# Patient Record
Sex: FEMALE | Race: ASIAN | NOT HISPANIC OR LATINO | URBAN - METROPOLITAN AREA
[De-identification: names, ages, dates, MRNs, and addresses within clinical notes are randomized per-mention and may not be internally consistent; named-entity substitution may affect disease eponyms.]

---

## 2023-10-29 ENCOUNTER — EMERGENCY (EMERGENCY)
Facility: HOSPITAL | Age: 73
LOS: 1 days | Discharge: HOME | End: 2023-10-29
Attending: EMERGENCY MEDICINE | Admitting: EMERGENCY MEDICINE
Payer: SELF-PAY

## 2023-10-29 VITALS
OXYGEN SATURATION: 96 % | RESPIRATION RATE: 16 BRPM | TEMPERATURE: 98 F | DIASTOLIC BLOOD PRESSURE: 72 MMHG | SYSTOLIC BLOOD PRESSURE: 126 MMHG | HEART RATE: 67 BPM

## 2023-10-29 VITALS
OXYGEN SATURATION: 97 % | WEIGHT: 165.35 LBS | HEART RATE: 73 BPM | DIASTOLIC BLOOD PRESSURE: 70 MMHG | SYSTOLIC BLOOD PRESSURE: 111 MMHG | RESPIRATION RATE: 16 BRPM | TEMPERATURE: 98 F | HEIGHT: 63.78 IN

## 2023-10-29 LAB
ALBUMIN SERPL ELPH-MCNC: 3.9 G/DL — SIGNIFICANT CHANGE UP (ref 3.4–5)
ALBUMIN SERPL ELPH-MCNC: 3.9 G/DL — SIGNIFICANT CHANGE UP (ref 3.4–5)
ALP SERPL-CCNC: 75 U/L — SIGNIFICANT CHANGE UP (ref 40–120)
ALP SERPL-CCNC: 75 U/L — SIGNIFICANT CHANGE UP (ref 40–120)
ALT FLD-CCNC: 31 U/L — SIGNIFICANT CHANGE UP (ref 12–42)
ALT FLD-CCNC: 31 U/L — SIGNIFICANT CHANGE UP (ref 12–42)
ANION GAP SERPL CALC-SCNC: 14 MMOL/L — SIGNIFICANT CHANGE UP (ref 9–16)
ANION GAP SERPL CALC-SCNC: 14 MMOL/L — SIGNIFICANT CHANGE UP (ref 9–16)
AST SERPL-CCNC: 43 U/L — HIGH (ref 15–37)
AST SERPL-CCNC: 43 U/L — HIGH (ref 15–37)
BASOPHILS # BLD AUTO: 0.07 K/UL — SIGNIFICANT CHANGE UP (ref 0–0.2)
BASOPHILS # BLD AUTO: 0.07 K/UL — SIGNIFICANT CHANGE UP (ref 0–0.2)
BASOPHILS NFR BLD AUTO: 0.6 % — SIGNIFICANT CHANGE UP (ref 0–2)
BASOPHILS NFR BLD AUTO: 0.6 % — SIGNIFICANT CHANGE UP (ref 0–2)
BILIRUB SERPL-MCNC: 1.1 MG/DL — SIGNIFICANT CHANGE UP (ref 0.2–1.2)
BILIRUB SERPL-MCNC: 1.1 MG/DL — SIGNIFICANT CHANGE UP (ref 0.2–1.2)
BUN SERPL-MCNC: 25 MG/DL — HIGH (ref 7–23)
BUN SERPL-MCNC: 25 MG/DL — HIGH (ref 7–23)
CALCIUM SERPL-MCNC: 9.8 MG/DL — SIGNIFICANT CHANGE UP (ref 8.5–10.5)
CALCIUM SERPL-MCNC: 9.8 MG/DL — SIGNIFICANT CHANGE UP (ref 8.5–10.5)
CHLORIDE SERPL-SCNC: 102 MMOL/L — SIGNIFICANT CHANGE UP (ref 96–108)
CHLORIDE SERPL-SCNC: 102 MMOL/L — SIGNIFICANT CHANGE UP (ref 96–108)
CO2 SERPL-SCNC: 20 MMOL/L — LOW (ref 22–31)
CO2 SERPL-SCNC: 20 MMOL/L — LOW (ref 22–31)
CREAT SERPL-MCNC: 0.59 MG/DL — SIGNIFICANT CHANGE UP (ref 0.5–1.3)
CREAT SERPL-MCNC: 0.59 MG/DL — SIGNIFICANT CHANGE UP (ref 0.5–1.3)
EGFR: 95 ML/MIN/1.73M2 — SIGNIFICANT CHANGE UP
EGFR: 95 ML/MIN/1.73M2 — SIGNIFICANT CHANGE UP
EOSINOPHIL # BLD AUTO: 0.08 K/UL — SIGNIFICANT CHANGE UP (ref 0–0.5)
EOSINOPHIL # BLD AUTO: 0.08 K/UL — SIGNIFICANT CHANGE UP (ref 0–0.5)
EOSINOPHIL NFR BLD AUTO: 0.7 % — SIGNIFICANT CHANGE UP (ref 0–6)
EOSINOPHIL NFR BLD AUTO: 0.7 % — SIGNIFICANT CHANGE UP (ref 0–6)
GLUCOSE SERPL-MCNC: 173 MG/DL — HIGH (ref 70–99)
GLUCOSE SERPL-MCNC: 173 MG/DL — HIGH (ref 70–99)
HCT VFR BLD CALC: 44.1 % — SIGNIFICANT CHANGE UP (ref 34.5–45)
HCT VFR BLD CALC: 44.1 % — SIGNIFICANT CHANGE UP (ref 34.5–45)
HGB BLD-MCNC: 14.7 G/DL — SIGNIFICANT CHANGE UP (ref 11.5–15.5)
HGB BLD-MCNC: 14.7 G/DL — SIGNIFICANT CHANGE UP (ref 11.5–15.5)
IMM GRANULOCYTES NFR BLD AUTO: 0.3 % — SIGNIFICANT CHANGE UP (ref 0–0.9)
IMM GRANULOCYTES NFR BLD AUTO: 0.3 % — SIGNIFICANT CHANGE UP (ref 0–0.9)
LYMPHOCYTES # BLD AUTO: 18.6 % — SIGNIFICANT CHANGE UP (ref 13–44)
LYMPHOCYTES # BLD AUTO: 18.6 % — SIGNIFICANT CHANGE UP (ref 13–44)
LYMPHOCYTES # BLD AUTO: 2.17 K/UL — SIGNIFICANT CHANGE UP (ref 1–3.3)
LYMPHOCYTES # BLD AUTO: 2.17 K/UL — SIGNIFICANT CHANGE UP (ref 1–3.3)
MCHC RBC-ENTMCNC: 30.9 PG — SIGNIFICANT CHANGE UP (ref 27–34)
MCHC RBC-ENTMCNC: 30.9 PG — SIGNIFICANT CHANGE UP (ref 27–34)
MCHC RBC-ENTMCNC: 33.3 GM/DL — SIGNIFICANT CHANGE UP (ref 32–36)
MCHC RBC-ENTMCNC: 33.3 GM/DL — SIGNIFICANT CHANGE UP (ref 32–36)
MCV RBC AUTO: 92.6 FL — SIGNIFICANT CHANGE UP (ref 80–100)
MCV RBC AUTO: 92.6 FL — SIGNIFICANT CHANGE UP (ref 80–100)
MONOCYTES # BLD AUTO: 0.98 K/UL — HIGH (ref 0–0.9)
MONOCYTES # BLD AUTO: 0.98 K/UL — HIGH (ref 0–0.9)
MONOCYTES NFR BLD AUTO: 8.4 % — SIGNIFICANT CHANGE UP (ref 2–14)
MONOCYTES NFR BLD AUTO: 8.4 % — SIGNIFICANT CHANGE UP (ref 2–14)
NEUTROPHILS # BLD AUTO: 8.35 K/UL — HIGH (ref 1.8–7.4)
NEUTROPHILS # BLD AUTO: 8.35 K/UL — HIGH (ref 1.8–7.4)
NEUTROPHILS NFR BLD AUTO: 71.4 % — SIGNIFICANT CHANGE UP (ref 43–77)
NEUTROPHILS NFR BLD AUTO: 71.4 % — SIGNIFICANT CHANGE UP (ref 43–77)
NRBC # BLD: 0 /100 WBCS — SIGNIFICANT CHANGE UP (ref 0–0)
NRBC # BLD: 0 /100 WBCS — SIGNIFICANT CHANGE UP (ref 0–0)
PLATELET # BLD AUTO: 293 K/UL — SIGNIFICANT CHANGE UP (ref 150–400)
PLATELET # BLD AUTO: 293 K/UL — SIGNIFICANT CHANGE UP (ref 150–400)
POTASSIUM SERPL-MCNC: 4.7 MMOL/L — SIGNIFICANT CHANGE UP (ref 3.5–5.3)
POTASSIUM SERPL-MCNC: 4.7 MMOL/L — SIGNIFICANT CHANGE UP (ref 3.5–5.3)
POTASSIUM SERPL-SCNC: 4.7 MMOL/L — SIGNIFICANT CHANGE UP (ref 3.5–5.3)
POTASSIUM SERPL-SCNC: 4.7 MMOL/L — SIGNIFICANT CHANGE UP (ref 3.5–5.3)
PROT SERPL-MCNC: 8.5 G/DL — HIGH (ref 6.4–8.2)
PROT SERPL-MCNC: 8.5 G/DL — HIGH (ref 6.4–8.2)
RBC # BLD: 4.76 M/UL — SIGNIFICANT CHANGE UP (ref 3.8–5.2)
RBC # BLD: 4.76 M/UL — SIGNIFICANT CHANGE UP (ref 3.8–5.2)
RBC # FLD: 14 % — SIGNIFICANT CHANGE UP (ref 10.3–14.5)
RBC # FLD: 14 % — SIGNIFICANT CHANGE UP (ref 10.3–14.5)
SODIUM SERPL-SCNC: 136 MMOL/L — SIGNIFICANT CHANGE UP (ref 132–145)
SODIUM SERPL-SCNC: 136 MMOL/L — SIGNIFICANT CHANGE UP (ref 132–145)
WBC # BLD: 11.68 K/UL — HIGH (ref 3.8–10.5)
WBC # BLD: 11.68 K/UL — HIGH (ref 3.8–10.5)
WBC # FLD AUTO: 11.68 K/UL — HIGH (ref 3.8–10.5)
WBC # FLD AUTO: 11.68 K/UL — HIGH (ref 3.8–10.5)

## 2023-10-29 PROCEDURE — 99285 EMERGENCY DEPT VISIT HI MDM: CPT

## 2023-10-29 PROCEDURE — 74177 CT ABD & PELVIS W/CONTRAST: CPT | Mod: 26

## 2023-10-29 RX ORDER — CIPROFLOXACIN LACTATE 400MG/40ML
1 VIAL (ML) INTRAVENOUS
Qty: 20 | Refills: 0
Start: 2023-10-29 | End: 2023-11-07

## 2023-10-29 RX ORDER — MORPHINE SULFATE 50 MG/1
2 CAPSULE, EXTENDED RELEASE ORAL ONCE
Refills: 0 | Status: DISCONTINUED | OUTPATIENT
Start: 2023-10-29 | End: 2023-10-29

## 2023-10-29 RX ORDER — METRONIDAZOLE 500 MG
500 TABLET ORAL ONCE
Refills: 0 | Status: COMPLETED | OUTPATIENT
Start: 2023-10-29 | End: 2023-10-29

## 2023-10-29 RX ORDER — HYDROCORTISONE 1 %
1 OINTMENT (GRAM) TOPICAL
Qty: 1 | Refills: 0
Start: 2023-10-29 | End: 2023-11-07

## 2023-10-29 RX ORDER — HYDROCORTISONE 1 %
1 OINTMENT (GRAM) TOPICAL
Qty: 1 | Refills: 0
Start: 2023-10-29

## 2023-10-29 RX ORDER — METRONIDAZOLE 500 MG
1 TABLET ORAL
Qty: 30 | Refills: 0
Start: 2023-10-29 | End: 2023-11-07

## 2023-10-29 RX ORDER — SODIUM CHLORIDE 9 MG/ML
1000 INJECTION INTRAMUSCULAR; INTRAVENOUS; SUBCUTANEOUS ONCE
Refills: 0 | Status: COMPLETED | OUTPATIENT
Start: 2023-10-29 | End: 2023-10-29

## 2023-10-29 RX ORDER — LIDOCAINE 4 G/100G
30 CREAM TOPICAL ONCE
Refills: 0 | Status: COMPLETED | OUTPATIENT
Start: 2023-10-29 | End: 2023-10-29

## 2023-10-29 RX ORDER — CIPROFLOXACIN LACTATE 400MG/40ML
500 VIAL (ML) INTRAVENOUS ONCE
Refills: 0 | Status: COMPLETED | OUTPATIENT
Start: 2023-10-29 | End: 2023-10-29

## 2023-10-29 RX ADMIN — SODIUM CHLORIDE 1000 MILLILITER(S): 9 INJECTION INTRAMUSCULAR; INTRAVENOUS; SUBCUTANEOUS at 20:15

## 2023-10-29 RX ADMIN — MORPHINE SULFATE 2 MILLIGRAM(S): 50 CAPSULE, EXTENDED RELEASE ORAL at 21:01

## 2023-10-29 RX ADMIN — Medication 500 MILLIGRAM(S): at 23:37

## 2023-10-29 NOTE — ED ADULT NURSE NOTE - OBJECTIVE STATEMENT
After obtaining consent, and per orders of Dr. Eugene Figueroa, injection of Pediarix and ActHIB given IM in RVL, Prevnar given IM in LVL, Rotateq given PO by Gosia Jimenez. Patient tolerated well.
Patient is a 72 y/o F c/o diarrhea. patient reports taking an enema for constipation yesterday. Patient reports abdominal pain and multiple episodes of diarrhea. Patient denies blood in stool or fevers.

## 2023-10-29 NOTE — ED ADULT NURSE NOTE - MODE OF DISCHARGE
S/p PRBC transfusion  Nuclear bleeding scan is negative  Gi input is noted  S/p colonscopy   And recommended CTA if she has further bleeding  Gi also recommended iv iron  Pt feels weak and looks pale and will transfuse 1 more unit of PRBC    Gi stated in their last note:  Findings:    TI: unable to enter due to looping and tortuosity  Ascending colon: normal  Transverse colon: 6 polyps (randing from 6mm - 1cm, largest x 1 removed with cautery snare, rest cold snare), removed  Descending colon: moderate diverticulosis, no bleeding  Sigmoid colon: severe diverticulosis, no bleeding  Rectum: normal. Retroflexion showed anal papillae, grade 1 internal hemorrhoids     Impressions:   1. Suspect possible diverticular bleeding as cause of hematochezia  2. 6 polyps removed with hot snare/cold snare techniques  3. Moderate/severe diverticulosis     Recommendations:   1. Monitor for postprocedure complication including bleeding perforation  2. Increase fiber  3. If recurrence of bleeding, obtain stat CTA vs Tagged RBC with embolization  4. Await pathology  5. Continue monitor H/H  6. Start liquid diet today and advance tomorrow.   Ambulatory

## 2023-10-29 NOTE — ED PROVIDER NOTE - PHYSICAL EXAMINATION
Patient is awake and alert, painful distress.  Spending much time on the toilet.  No fever, no hypoxemia.  HEENT intact.  Scalp atraumatic.  Slightly dry mouth.  Neck supple.  No respiratory distress noted.  Abdomen soft, mildly distended and diffusely tender.  Rectal area is red, macerated, bloody, multiple hemorrhoids visible.  On rectal exam patient is impacted.

## 2023-10-29 NOTE — ED ADULT NURSE REASSESSMENT NOTE - NS ED NURSE REASSESS COMMENT FT1
Straight cath pt with  on language line Yaku [id number], using asceptic technique, drained 1200mls, dr Murray aware

## 2023-10-29 NOTE — ED PROVIDER NOTE - CLINICAL SUMMARY MEDICAL DECISION MAKING FREE TEXT BOX
This patient is a 73-year-old female, no known medical history.  Patient presents to the emergency department with abdominal pain, cramping and loose stools.  In the emergency department the patient is with her  and the poor manager.  Patient traveled from [facility] 5 days ago and is traveling back tomorrow.  Using the  services.  It turns out this patient has been having severe constipation prior to this trip, as well as on other occasions.  She was having severe constipation when she arrived, she was able to use 1 enema, and then proceeded to have leaky wet stools, but no solid stool.  Patient continues to have some abdominal cramping and severe pain to the rectal area.  In the emergency department she is awake and alert, she is spending a lot of time sitting on the toilet, trying to move her bowels.  She is afebrile.  She is in painful distress.   and  are at the bedside.  On exam, has abdominal pain slightly distended bladder.  Rectal exam shows macerated region to the rectal area, and impaction.  Patient was disimpacted manually.  Differential diagnosis for this patient includes but not limited to: Chronic constipation, colitis, colon cancer, obstruction, more.  Patient was slightly evaded white blood cell count, no fever.  Disimpaction removed a significant amount of bloody stool.  Patient feels somewhat more comfortable at this time.  But still feels like she needs to move her bowels.  CT scan was done  Which showed proctitis as well as a distended bladder.  Urinary straight cath was done to drain the urine.  Recommendation for this patient is that she be hospitalized for IV antibiotics for proctitis and further evaluation of chronic constipation.  Translation services used to explain this to this patient.  Translation number is [id number].  Patient is scheduled to fly back to [facility] tomorrow.  Patient is unable to  stay in the hospital at this time.  Patient refusing admission.    Next option is to treat the patient with antibiotics, Cipro and Flagyl prescriptions written.  As well as Anusol suppositories and steroid cream for the rectal region.  Patient is to follow-up with her physician once she arrives in [facility] for further work-up for constipation.

## 2023-10-29 NOTE — ED PROVIDER NOTE - NSFOLLOWUPINSTRUCTIONS_ED_ALL_ED_FT
Thank you for letting us take care of you at [facility] emergency department today.  You presented to the emergency department with chronic constipation.  You have developed an infection in your rectum and colon.  Please take the antibiotics as indicated.  Take the ciprofloxacin 500 mg twice daily.  Take the Flagyl 500 mg 3 times a day.  Use the Anusol suppositories twice daily.  Use the hydrocortisone cream 2-3 times a day as well.  Avoid sitting on the toilet and straining for extended period of time.  For your constipation you should be sure to drink plenty of water.  Avoid eating rice and bread on a regular basis.  Increase your dietary fiber by eating kale, cabbage and spinach.  Please follow-up with your physician once you reach [place], as your chronic constipation can be due to more than just her diet.  There could be a correlation with colon cancer.  Realize that you are refusing admission to the hospital at this time.  My recommendation was for you to be hospitalized for IV antibiotics and further management .  Please be sure to follow-up with your physician.

## 2023-10-29 NOTE — ED PROVIDER NOTE - NS ED ROS FT
Patient denies fevers, chills.  No headache, no neck pain.  No chest pain, no shortness of breath.  Patient complaining of abdominal pain, slight abdominal distention, and severe rectal pain.

## 2023-10-29 NOTE — ED PROVIDER NOTE - OBJECTIVE STATEMENT
This patient is a 73-year-old female, no known medical history.  Patient presents to the emergency department with abdominal pain, cramping and loose stools.  In the emergency department the patient is with her  and the poor manager.  Patient traveled from [facility] 5 days ago and is traveling back tomorrow.  Using the  services.  It turns out this patient has been having severe constipation prior to this trip, as well as on other occasions.  She was having severe constipation when she arrived, she was able to use 1 enema, and then proceeded to have leaky wet stools, but no solid stool.  Patient continues to have some abdominal cramping and severe pain to the rectal area.  In the emergency department she is awake and alert, she is spending a lot of time sitting on the toilet, trying to move her bowels.  She is afebrile.  She is in painful distress.   and  are at the bedside.

## 2023-10-29 NOTE — ED ADULT TRIAGE NOTE - CHIEF COMPLAINT QUOTE
Pt BIBA from Hotel travelling from [place] 5 days ago has been constipated since. 3 days ago Pt used enema and since has been experiencing diarrhea.

## 2023-10-29 NOTE — ED ADULT NURSE REASSESSMENT NOTE - NS ED NURSE REASSESS COMMENT FT1
Pt care handed over from Isidra Chan, isidra Hernandez obtained blood cultures and gave meds with thanks, Dr Murray no longer requires covid swab or straight cath, pt alert and orientated , awaiting further medical review

## 2023-10-29 NOTE — ED PROVIDER NOTE - PROGRESS NOTE DETAILS
Patient was disimpacted manually.  Patient was given morphine IV.  Viscous lidocaine to the rectal area.  A significant amount of bloody stool was removed.  Hemorrhoids were pushed back into place.

## 2023-10-29 NOTE — ED PROVIDER NOTE - PATIENT PORTAL LINK FT
You can access the FollowMyHealth Patient Portal offered by [facility] by registering at the following website: [email or web address]. By joining [employer]’s FollowMyHealth portal, you will also be able to view your health information using other applications (apps) compatible with our system.

## 2023-10-30 LAB
-  STREPTOCOCCUS SP. (NOT GRP A, B OR S PNEUMONIAE): SIGNIFICANT CHANGE UP
-  STREPTOCOCCUS SP. (NOT GRP A, B OR S PNEUMONIAE): SIGNIFICANT CHANGE UP
E COLI DNA BLD POS QL NAA+NON-PROBE: SIGNIFICANT CHANGE UP
E COLI DNA BLD POS QL NAA+NON-PROBE: SIGNIFICANT CHANGE UP
GRAM STN FLD: ABNORMAL
METHOD TYPE: SIGNIFICANT CHANGE UP
METHOD TYPE: SIGNIFICANT CHANGE UP
SPECIMEN SOURCE: SIGNIFICANT CHANGE UP

## 2023-10-30 NOTE — ED POST DISCHARGE NOTE - ADDITIONAL DOCUMENTATION
Pharmacy called for change in prescription.  Patient was prescribed 10% hydrocortisone, formulation of which is not available.  I canceled the prescription, patient has a [place] address, a cement is not available to replace the prescription.  Chart reviewed by me, pt with proctitis, constipation, required disimpacting in ED. Advised pharmacy to indicate to patient she can  proctozone/HC OTC as it is over-the-counter.

## 2023-11-01 DIAGNOSIS — K59.09 OTHER CONSTIPATION: ICD-10-CM

## 2023-11-01 DIAGNOSIS — K51.20 ULCERATIVE (CHRONIC) PROCTITIS WITHOUT COMPLICATIONS: ICD-10-CM

## 2023-11-01 DIAGNOSIS — K62.89 OTHER SPECIFIED DISEASES OF ANUS AND RECTUM: ICD-10-CM

## 2023-11-01 DIAGNOSIS — D72.829 ELEVATED WHITE BLOOD CELL COUNT, UNSPECIFIED: ICD-10-CM

## 2023-11-01 LAB
-  AMIKACIN: SIGNIFICANT CHANGE UP
-  AMIKACIN: SIGNIFICANT CHANGE UP
-  AMPICILLIN/SULBACTAM: SIGNIFICANT CHANGE UP
-  AMPICILLIN/SULBACTAM: SIGNIFICANT CHANGE UP
-  AMPICILLIN: SIGNIFICANT CHANGE UP
-  AMPICILLIN: SIGNIFICANT CHANGE UP
-  AZTREONAM: SIGNIFICANT CHANGE UP
-  AZTREONAM: SIGNIFICANT CHANGE UP
-  CEFAZOLIN: SIGNIFICANT CHANGE UP
-  CEFAZOLIN: SIGNIFICANT CHANGE UP
-  CEFEPIME: SIGNIFICANT CHANGE UP
-  CEFEPIME: SIGNIFICANT CHANGE UP
-  CEFOXITIN: SIGNIFICANT CHANGE UP
-  CEFOXITIN: SIGNIFICANT CHANGE UP
-  CEFTRIAXONE: SIGNIFICANT CHANGE UP
-  CEFTRIAXONE: SIGNIFICANT CHANGE UP
-  CIPROFLOXACIN: SIGNIFICANT CHANGE UP
-  CIPROFLOXACIN: SIGNIFICANT CHANGE UP
-  ERTAPENEM: SIGNIFICANT CHANGE UP
-  ERTAPENEM: SIGNIFICANT CHANGE UP
-  GENTAMICIN: SIGNIFICANT CHANGE UP
-  GENTAMICIN: SIGNIFICANT CHANGE UP
-  IMIPENEM: SIGNIFICANT CHANGE UP
-  IMIPENEM: SIGNIFICANT CHANGE UP
-  LEVOFLOXACIN: SIGNIFICANT CHANGE UP
-  LEVOFLOXACIN: SIGNIFICANT CHANGE UP
-  MEROPENEM: SIGNIFICANT CHANGE UP
-  MEROPENEM: SIGNIFICANT CHANGE UP
-  PIPERACILLIN/TAZOBACTAM: SIGNIFICANT CHANGE UP
-  PIPERACILLIN/TAZOBACTAM: SIGNIFICANT CHANGE UP
-  TOBRAMYCIN: SIGNIFICANT CHANGE UP
-  TOBRAMYCIN: SIGNIFICANT CHANGE UP
-  TRIMETHOPRIM/SULFAMETHOXAZOLE: SIGNIFICANT CHANGE UP
-  TRIMETHOPRIM/SULFAMETHOXAZOLE: SIGNIFICANT CHANGE UP
METHOD TYPE: SIGNIFICANT CHANGE UP
METHOD TYPE: SIGNIFICANT CHANGE UP

## 2023-11-02 LAB
-  CEFTRIAXONE: SIGNIFICANT CHANGE UP
-  CEFTRIAXONE: SIGNIFICANT CHANGE UP
-  PENICILLIN: SIGNIFICANT CHANGE UP
-  PENICILLIN: SIGNIFICANT CHANGE UP
-  VANCOMYCIN: SIGNIFICANT CHANGE UP
-  VANCOMYCIN: SIGNIFICANT CHANGE UP
CULTURE RESULTS: ABNORMAL
METHOD TYPE: SIGNIFICANT CHANGE UP
ORGANISM # SPEC MICROSCOPIC CNT: ABNORMAL
ORGANISM # SPEC MICROSCOPIC CNT: SIGNIFICANT CHANGE UP
ORGANISM # SPEC MICROSCOPIC CNT: SIGNIFICANT CHANGE UP
SPECIMEN SOURCE: SIGNIFICANT CHANGE UP

## 2024-09-05 NOTE — ED ADULT TRIAGE NOTE - AS TEMP SITE
oral
PAST MEDICAL HISTORY:  CAD (coronary artery disease)     Elevated cholesterol     GERD (gastroesophageal reflux disease)     Hypertension     PNA (pneumonia)     Small cell B-cell lymphoma